# Patient Record
Sex: MALE | Race: WHITE | NOT HISPANIC OR LATINO | Employment: FULL TIME | URBAN - METROPOLITAN AREA
[De-identification: names, ages, dates, MRNs, and addresses within clinical notes are randomized per-mention and may not be internally consistent; named-entity substitution may affect disease eponyms.]

---

## 2024-01-31 ENCOUNTER — OFFICE VISIT (OUTPATIENT)
Dept: CARDIOLOGY | Facility: CLINIC | Age: 69
End: 2024-01-31
Payer: COMMERCIAL

## 2024-01-31 VITALS
BODY MASS INDEX: 32.73 KG/M2 | HEIGHT: 74 IN | OXYGEN SATURATION: 93 % | HEART RATE: 73 BPM | WEIGHT: 255 LBS | SYSTOLIC BLOOD PRESSURE: 114 MMHG | DIASTOLIC BLOOD PRESSURE: 76 MMHG

## 2024-01-31 DIAGNOSIS — I21.4: ICD-10-CM

## 2024-01-31 DIAGNOSIS — I10 ESSENTIAL HYPERTENSION: ICD-10-CM

## 2024-01-31 DIAGNOSIS — E78.49 OTHER HYPERLIPIDEMIA: ICD-10-CM

## 2024-01-31 DIAGNOSIS — R03.0 ELEVATED BLOOD PRESSURE READING: Primary | ICD-10-CM

## 2024-01-31 PROCEDURE — 99204 OFFICE O/P NEW MOD 45 MIN: CPT | Performed by: INTERNAL MEDICINE

## 2024-01-31 PROCEDURE — 3074F SYST BP LT 130 MM HG: CPT | Performed by: INTERNAL MEDICINE

## 2024-01-31 PROCEDURE — 1036F TOBACCO NON-USER: CPT | Performed by: INTERNAL MEDICINE

## 2024-01-31 PROCEDURE — 1159F MED LIST DOCD IN RCRD: CPT | Performed by: INTERNAL MEDICINE

## 2024-01-31 PROCEDURE — 3078F DIAST BP <80 MM HG: CPT | Performed by: INTERNAL MEDICINE

## 2024-01-31 PROCEDURE — 1125F AMNT PAIN NOTED PAIN PRSNT: CPT | Performed by: INTERNAL MEDICINE

## 2024-01-31 RX ORDER — ASPIRIN 81 MG/1
81 TABLET ORAL
COMMUNITY
Start: 2023-10-25

## 2024-01-31 RX ORDER — ATORVASTATIN CALCIUM 40 MG/1
40 TABLET, FILM COATED ORAL NIGHTLY
COMMUNITY

## 2024-01-31 RX ORDER — CARVEDILOL 3.12 MG/1
3.12 TABLET ORAL 2 TIMES DAILY
COMMUNITY

## 2024-01-31 RX ORDER — OLMESARTAN MEDOXOMIL AND HYDROCHLOROTHIAZIDE 40/12.5 40; 12.5 MG/1; MG/1
1 TABLET ORAL DAILY
COMMUNITY

## 2024-01-31 RX ORDER — REGADENOSON 0.08 MG/ML
0.4 INJECTION, SOLUTION INTRAVENOUS
Status: CANCELLED | OUTPATIENT
Start: 2024-01-31

## 2024-01-31 ASSESSMENT — ENCOUNTER SYMPTOMS: FOCAL WEAKNESS: 1

## 2024-01-31 NOTE — PROGRESS NOTES
"Subjective   Nakul Nascimento is a 69 y.o. male.    Chief Complaint:  Hospital follow-up for stroke and positive troponins.    HPI    This is a 69-year-old gentleman who presented to [facility] with facial droop and right-sided weakness.  He was found to have evidence of acute stroke.  Initial CT showed no acute findings.  However follow-up MRI showed a pontine stroke.  He has noted to have positive troponins.  He was reported to have chest pain although the patient does not recall having any chest discomfort when he was in the hospital.  He is making a slow recovery.  His right arm which was initially flaccid has recovered substantially.  He is working very hard in rehab.    His cardiac history is significant for hypertension.  No past history of evidence of coronary disease.  Cardiac risk factors include hypertension and recent months smoking history.  He tells me that his lipid profiles have been okay.    Allergies to medications: None known    Social history: Former smoker quit about 30 years ago.    Family history: No family history of premature coronary artery disease.    Review of Systems   Constitutional: Positive for malaise/fatigue.   Cardiovascular:  Positive for chest pain.   Neurological:  Positive for focal weakness.   All other systems reviewed and are negative.      Visit Vitals  /76 (BP Location: Left arm, Patient Position: Sitting, BP Cuff Size: Adult)   Pulse 73   Ht 1.88 m (6' 2\")   Wt 116 kg (255 lb)   SpO2 93%   BMI 32.74 kg/m²   Smoking Status Former   BSA 2.46 m²        Objective     Constitutional:       Appearance: Not in distress.   Neck:      Vascular: JVD normal.   Pulmonary:      Breath sounds: Normal breath sounds.   Cardiovascular:      Normal rate. Regular rhythm. S1 with normal intensity. S2 with normal intensity.       Murmurs: There is no murmur.      No gallop.    Pulses:     Intact distal pulses.   Edema:     Peripheral edema absent.   Abdominal:      General: Bowel " sounds are normal.   Neurological:      Mental Status: Alert and oriented to person, place and time.         Lab Review:   Lab Results   Component Value Date     11/16/2022    K 3.8 11/16/2022     11/16/2022    CO2 27 11/16/2022    BUN 14 11/16/2022    CREATININE 0.94 11/16/2022    GLUCOSE 97 11/16/2022    CALCIUM 9.0 11/16/2022         Assessment:    1.  Acute CVA.  Secondary to what appears to be a lacunar stroke.  This involves the jaylan.  He is making a nice recovery.  No evidence of any cardiac arrhythmias.  Discussed with the patient and family the stroke syndrome etiology.  Overall feel he has a decent prognosis.    2.  Positive troponins.  According the chart he described having chest pain.  There were typical and atypical components to the chest discomfort.  Troponins were in the 60-70 range and clearly elevated.  However this could also be seen with an acute stroke.    3.  Hypertension.  Pressures are normal.    4.  Hyperlipidemia.  Followed by primary care.    For completeness we are going to do a Lexiscan thallium study.

## 2024-03-05 ENCOUNTER — TELEPHONE (OUTPATIENT)
Dept: CARDIOLOGY | Facility: CLINIC | Age: 69
End: 2024-03-05
Payer: COMMERCIAL

## 2024-03-07 NOTE — TELEPHONE ENCOUNTER
Shanna LM and stated pt was ordered 2 tests, 1 test was denied and 1 test was approved.    Shanna asking which test pt will have?     **Miriam, can you help with this? Thanks.

## 2024-03-12 PROBLEM — R03.0 ELEVATED BLOOD PRESSURE READING: Status: RESOLVED | Noted: 2024-01-31 | Resolved: 2024-03-12

## 2024-03-12 PROBLEM — R79.89 ELEVATED TROPONIN: Status: ACTIVE | Noted: 2023-12-14

## 2024-03-12 PROBLEM — I63.9 STROKE (MULTI): Status: ACTIVE | Noted: 2024-03-12

## 2024-03-12 PROBLEM — E78.5 HYPERLIPIDEMIA: Status: ACTIVE | Noted: 2024-01-31

## 2024-03-18 NOTE — PROGRESS NOTES
"Honey Nascimento is a 69 y.o. male.    Chief Complaint:  Follow-up stress testing    HPI    On his last visit he was here for follow-up of he has [facility] hospitalization.  He had a stroke and was noted to have positive troponins.  We elected to proceed with a stress thallium study    He presented to [facility] with facial droop and right-sided weakness.  He was found to have evidence of acute stroke.  Initial CT showed no acute findings.  However follow-up MRI showed a pontine stroke.  He has noted to have positive troponins.  He was reported to have chest pain although the patient does not recall having any chest discomfort when he was in the hospital.       His cardiac history is significant for hypertension.  No past history of evidence of coronary disease.  Cardiac risk factors include hypertension and recent months smoking history.  He tells me that his lipid profiles have been okay.     Allergies to medications: None known     Social history: Former smoker quit about 30 years ago.     Family history: No family history of premature coronary artery disease.     Review of Systems   Constitutional: Positive for malaise/fatigue.   Cardiovascular:  Positive for chest pain.   Neurological:  Positive for focal weakness.   All other systems reviewed and are negative.      Visit Vitals  /82 (BP Location: Right arm, Patient Position: Sitting)   Pulse 57   Ht 1.88 m (6' 2\")   Wt 113 kg (250 lb)   SpO2 90%   BMI 32.10 kg/m²   Smoking Status Former   BSA 2.43 m²        Objective     Constitutional:       Appearance: Not in distress.   Neck:      Vascular: JVD normal.   Pulmonary:      Breath sounds: Normal breath sounds.   Cardiovascular:      Normal rate. Regular rhythm. S1 with normal intensity. S2 with normal intensity.       Murmurs: There is no murmur.      No gallop.    Pulses:     Intact distal pulses.   Edema:     Peripheral edema absent.   Abdominal:      General: Bowel sounds " are normal.   Neurological:      Mental Status: Alert and oriented to person, place and time.             Assessment:    1.  Positive troponins.  Today stress time study was normal showing a left ventricular ejection fraction of 63%.    2.  Acute CVA.  Making a good recovery.  Appears to be a lacunar stroke.    3.  Hyperlipidemia.  Followed by primary care

## 2024-03-19 ENCOUNTER — HOSPITAL ENCOUNTER (OUTPATIENT)
Dept: RADIOLOGY | Facility: CLINIC | Age: 69
Discharge: HOME | End: 2024-03-19
Payer: COMMERCIAL

## 2024-03-19 ENCOUNTER — HOSPITAL ENCOUNTER (OUTPATIENT)
Dept: CARDIOLOGY | Facility: CLINIC | Age: 69
Discharge: HOME | End: 2024-03-19
Payer: COMMERCIAL

## 2024-03-19 ENCOUNTER — OFFICE VISIT (OUTPATIENT)
Dept: CARDIOLOGY | Facility: CLINIC | Age: 69
End: 2024-03-19
Payer: COMMERCIAL

## 2024-03-19 VITALS
SYSTOLIC BLOOD PRESSURE: 128 MMHG | WEIGHT: 250 LBS | BODY MASS INDEX: 32.08 KG/M2 | DIASTOLIC BLOOD PRESSURE: 82 MMHG | HEART RATE: 57 BPM | OXYGEN SATURATION: 90 % | HEIGHT: 74 IN

## 2024-03-19 DIAGNOSIS — R79.89 ELEVATED TROPONIN: Primary | ICD-10-CM

## 2024-03-19 DIAGNOSIS — I21.4: ICD-10-CM

## 2024-03-19 DIAGNOSIS — R79.89 ELEVATED TROPONIN: ICD-10-CM

## 2024-03-19 DIAGNOSIS — E78.5 HYPERLIPIDEMIA, UNSPECIFIED HYPERLIPIDEMIA TYPE: ICD-10-CM

## 2024-03-19 DIAGNOSIS — I10 ESSENTIAL HYPERTENSION: ICD-10-CM

## 2024-03-19 DIAGNOSIS — I10 ESSENTIAL HYPERTENSION: Primary | ICD-10-CM

## 2024-03-19 PROCEDURE — 1159F MED LIST DOCD IN RCRD: CPT | Performed by: INTERNAL MEDICINE

## 2024-03-19 PROCEDURE — 3074F SYST BP LT 130 MM HG: CPT | Performed by: INTERNAL MEDICINE

## 2024-03-19 PROCEDURE — 3079F DIAST BP 80-89 MM HG: CPT | Performed by: INTERNAL MEDICINE

## 2024-03-19 PROCEDURE — 78452 HT MUSCLE IMAGE SPECT MULT: CPT

## 2024-03-19 PROCEDURE — 2500000004 HC RX 250 GENERAL PHARMACY W/ HCPCS (ALT 636 FOR OP/ED): Performed by: INTERNAL MEDICINE

## 2024-03-19 PROCEDURE — 99213 OFFICE O/P EST LOW 20 MIN: CPT | Performed by: INTERNAL MEDICINE

## 2024-03-19 PROCEDURE — 78452 HT MUSCLE IMAGE SPECT MULT: CPT | Performed by: INTERNAL MEDICINE

## 2024-03-19 PROCEDURE — 93017 CV STRESS TEST TRACING ONLY: CPT

## 2024-03-19 RX ORDER — REGADENOSON 0.08 MG/ML
0.4 INJECTION, SOLUTION INTRAVENOUS
Status: COMPLETED | OUTPATIENT
Start: 2024-03-19 | End: 2024-03-19

## 2024-03-19 RX ADMIN — REGADENOSON 0.4 MG: 0.08 INJECTION, SOLUTION INTRAVENOUS at 10:52
